# Patient Record
(demographics unavailable — no encounter records)

---

## 2024-12-04 NOTE — DISCUSSION/SUMMARY
[de-identified] : We discussed formal PT, a home exercise program, ice therapy and the role of NSAIDS for the pain. These modalities will improve the patient's functionality in their activities of daily life.  Risks, benefits and contraindications were discussed.  The patient will follow up in 6 weeks or sooner as needed.

## 2024-12-04 NOTE — PHYSICAL EXAM
[4___] : hamstring 4[unfilled]/5 [Positive] : positive Ayleen [Right] : right knee [Lateral] : lateral [[place]] : skyline [AP Standing] : anteroposterior standing [There are no fractures, subluxations or dislocations. No significant abnormalities are seen] : There are no fractures, subluxations or dislocations. No significant abnormalities are seen [NL (90)] : forward flexion 90 degrees [NL (30)] : right lateral rotation 30 degrees [NL (45)] : extension 45 degrees [NL (40)] : right lateral bending 40 degrees [5___] : right extensor hallicus longus 5[unfilled]/5 [] : non-antalgic [TWNoteComboBox7] : flexion 135 degrees [de-identified] : extension 0 degrees

## 2024-12-04 NOTE — HISTORY OF PRESENT ILLNESS
[de-identified] : Since her last visit in April 2023, patient reports pain over the patella with prolonged sitting

## 2025-01-09 NOTE — DISCUSSION/SUMMARY
[de-identified] : We discussed injection followed by continued formal PT, a home exercise program, ice therapy and the role of NSAIDS for the pain. These modalities will improve the patient's functionality in their activities of daily life.  Risks, benefits and contraindications were discussed.  The patient will follow up in 6 weeks or sooner as needed.

## 2025-01-09 NOTE — DISCUSSION/SUMMARY
[de-identified] : We discussed injection followed by continued formal PT, a home exercise program, ice therapy and the role of NSAIDS for the pain. These modalities will improve the patient's functionality in their activities of daily life.  Risks, benefits and contraindications were discussed.  The patient will follow up in 6 weeks or sooner as needed.

## 2025-01-09 NOTE — HISTORY OF PRESENT ILLNESS
[de-identified] : Patient reports increased pain with PT and HEP. She is unable to fully extend the knee. The pain is in the medial and lateral PF knee.

## 2025-01-09 NOTE — HISTORY OF PRESENT ILLNESS
[de-identified] : Patient reports increased pain with PT and HEP. She is unable to fully extend the knee. The pain is in the medial and lateral PF knee.

## 2025-01-09 NOTE — PHYSICAL EXAM
[NL (90)] : forward flexion 90 degrees [NL (30)] : right lateral rotation 30 degrees [NL (45)] : extension 45 degrees [NL (40)] : right lateral bending 40 degrees [5___] : right extensor hallicus longus 5[unfilled]/5 [4___] : hamstring 4[unfilled]/5 [Positive] : positive Ayleen [Right] : right knee [Lateral] : lateral [[place]] : skyline [AP Standing] : anteroposterior standing [There are no fractures, subluxations or dislocations. No significant abnormalities are seen] : There are no fractures, subluxations or dislocations. No significant abnormalities are seen [] : non-antalgic [TWNoteComboBox7] : flexion 135 degrees [de-identified] : extension 0 degrees

## 2025-01-09 NOTE — PROCEDURE
[Large Joint Injection] : Large joint injection [Right] : of the right [Knee] : knee [Pain] : pain [Inflammation] : inflammation [Alcohol] : alcohol [Betadine] : betadine [Ethyl Chloride sprayed topically] : ethyl chloride sprayed topically [___ cc    6mg] :  Betamethasone ([place]) ~Vcc of 6mg [___ cc    1%] : Lidocaine ~Vcc of 1%  [] : Patient tolerated procedure well [Call if redness, pain or fever occur] : call if redness, pain or fever occur [Apply ice for 15min out of every hour for the next 12-24 hours as tolerated] : apply ice for 15 minutes out of every hour for the next 12-24 hours as tolerated [Previous OTC use and PT nontherapeutic] : patient has tried OTC's including aspirin, Ibuprofen, Aleve, etc or prescription NSAIDS, and/or exercises at home and/or physical therapy without satisfactory response [Patient had decreased mobility in the joint] : patient had decreased mobility in the joint [Risks, benefits, alternatives discussed / Verbal consent obtained] : the risks benefits, and alternatives have been discussed, and verbal consent was obtained [All ultrasound images have been permanently captured and stored accordingly in our picture archiving and communication system] : All ultrasound images have been permanently captured and stored accordingly in our picture archiving and communication system [Visualization of the needle and placement of injection was performed without complication] : visualization of the needle and placement of injection was performed without complication [de-identified] : needle placement

## 2025-01-09 NOTE — PROCEDURE
[Large Joint Injection] : Large joint injection [Right] : of the right [Knee] : knee [Pain] : pain [Inflammation] : inflammation [Alcohol] : alcohol [Betadine] : betadine [Ethyl Chloride sprayed topically] : ethyl chloride sprayed topically [___ cc    6mg] :  Betamethasone ([place]) ~Vcc of 6mg [___ cc    1%] : Lidocaine ~Vcc of 1%  [] : Patient tolerated procedure well [Call if redness, pain or fever occur] : call if redness, pain or fever occur [Apply ice for 15min out of every hour for the next 12-24 hours as tolerated] : apply ice for 15 minutes out of every hour for the next 12-24 hours as tolerated [Previous OTC use and PT nontherapeutic] : patient has tried OTC's including aspirin, Ibuprofen, Aleve, etc or prescription NSAIDS, and/or exercises at home and/or physical therapy without satisfactory response [Patient had decreased mobility in the joint] : patient had decreased mobility in the joint [Risks, benefits, alternatives discussed / Verbal consent obtained] : the risks benefits, and alternatives have been discussed, and verbal consent was obtained [All ultrasound images have been permanently captured and stored accordingly in our picture archiving and communication system] : All ultrasound images have been permanently captured and stored accordingly in our picture archiving and communication system [Visualization of the needle and placement of injection was performed without complication] : visualization of the needle and placement of injection was performed without complication [de-identified] : needle placement

## 2025-01-09 NOTE — PHYSICAL EXAM
[NL (90)] : forward flexion 90 degrees [NL (30)] : right lateral rotation 30 degrees [NL (45)] : extension 45 degrees [NL (40)] : right lateral bending 40 degrees [5___] : right extensor hallicus longus 5[unfilled]/5 [4___] : hamstring 4[unfilled]/5 [Positive] : positive Ayleen [Right] : right knee [Lateral] : lateral [[place]] : skyline [AP Standing] : anteroposterior standing [There are no fractures, subluxations or dislocations. No significant abnormalities are seen] : There are no fractures, subluxations or dislocations. No significant abnormalities are seen [] : non-antalgic [TWNoteComboBox7] : flexion 135 degrees [de-identified] : extension 0 degrees

## 2025-01-27 NOTE — DISCUSSION/SUMMARY
[de-identified] : We discussed MRI to reeval formal PT, a home exercise program, ice therapy and the role of NSAIDS for the pain. These modalities will improve the patient's functionality in their activities of daily life.  Risks, benefits and contraindications were discussed.  The patient will follow up in 6 weeks or sooner as needed.

## 2025-01-27 NOTE — PHYSICAL EXAM
[NL (90)] : forward flexion 90 degrees [NL (30)] : right lateral rotation 30 degrees [NL (45)] : extension 45 degrees [NL (40)] : right lateral bending 40 degrees [5___] : right extensor hallicus longus 5[unfilled]/5 [4___] : hamstring 4[unfilled]/5 [Positive] : positive Ayleen [Right] : right knee [Lateral] : lateral [[place]] : skyline [AP Standing] : anteroposterior standing [There are no fractures, subluxations or dislocations. No significant abnormalities are seen] : There are no fractures, subluxations or dislocations. No significant abnormalities are seen [] : non-antalgic [TWNoteComboBox7] : flexion 135 degrees [de-identified] : extension 0 degrees

## 2025-01-29 NOTE — DISCUSSION/SUMMARY
[Medication Risks Reviewed] : Medication risks reviewed [de-identified] : We discussed viscosupplementation injection followed by continued formal PT, a home exercise program, ice therapy and the role of NSAIDS (meloxicam) for the pain. These modalities will improve the patient's functionality in their activities of daily life.  Risks, benefits and contraindications were discussed.  The patient will follow up in 6 weeks or sooner as needed.

## 2025-01-29 NOTE — PROCEDURE
[Large Joint Injection] : Large joint injection [Right] : of the right [Knee] : knee [Pain] : pain [Inflammation] : inflammation [X-ray evidence of Osteoarthritis on this or prior visit] : x-ray evidence of Osteoarthritis on this or prior visit [Alcohol] : alcohol [Betadine] : betadine [Ethyl Chloride sprayed topically] : ethyl chloride sprayed topically [Durolane (60mg)] : 60mg of Durolane [] : Patient tolerated procedure well [Call if redness, pain or fever occur] : call if redness, pain or fever occur [Apply ice for 15min out of every hour for the next 12-24 hours as tolerated] : apply ice for 15 minutes out of every hour for the next 12-24 hours as tolerated [Patient was advised to rest the joint(s) for ____ days] : patient was advised to rest the joint(s) for [unfilled] days [Previous OTC use and PT nontherapeutic] : patient has tried OTC's including aspirin, Ibuprofen, Aleve, etc or prescription NSAIDS, and/or exercises at home and/or physical therapy without satisfactory response [Patient had decreased mobility in the joint] : patient had decreased mobility in the joint [Risks, benefits, alternatives discussed / Verbal consent obtained] : the risks benefits, and alternatives have been discussed, and verbal consent was obtained [All ultrasound images have been permanently captured and stored accordingly in our picture archiving and communication system] : All ultrasound images have been permanently captured and stored accordingly in our picture archiving and communication system [Visualization of the needle and placement of injection was performed without complication] : visualization of the needle and placement of injection was performed without complication

## 2025-01-29 NOTE — DATA REVIEWED
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

## 2025-01-29 NOTE — PHYSICAL EXAM
[NL (90)] : forward flexion 90 degrees [NL (30)] : right lateral rotation 30 degrees [NL (45)] : extension 45 degrees [NL (40)] : right lateral bending 40 degrees [5___] : right extensor hallicus longus 5[unfilled]/5 [4___] : hamstring 4[unfilled]/5 [Positive] : positive Ayleen [Right] : right knee [Lateral] : lateral [[place]] : skyline [AP Standing] : anteroposterior standing [There are no fractures, subluxations or dislocations. No significant abnormalities are seen] : There are no fractures, subluxations or dislocations. No significant abnormalities are seen [] : non-antalgic [TWNoteComboBox7] : flexion 135 degrees [de-identified] : extension 0 degrees

## 2025-02-13 NOTE — HISTORY OF PRESENT ILLNESS
[de-identified] : Patient is 2 weeks s/p visco injection, reports improvement in ability to walk better. She does note that the lateral knee becomes painful with certain ROM and activity. She notes weakness with descending stairs. She does note pain in the lower back with rotational movement

## 2025-02-13 NOTE — DISCUSSION/SUMMARY
[Surgical risks reviewed] : Surgical risks reviewed [de-identified] : The risks of the procedure, including but not limited to infection, bleeding, anesthetic complication, neurovascular injury and the possibility of subsequent surgery were discussed; the benefits, non-operative alternatives and expectations of the proposed procedure were also discussed. Post-operative management, the procedure itself and the expected recovery period were discussed at length with the patient. The need for post-operative physical therapy and home exercise regimen, possible bracing and medication management were also discussed. Informed consent was obtained.

## 2025-02-13 NOTE — PHYSICAL EXAM
[NL (90)] : forward flexion 90 degrees [NL (30)] : right lateral rotation 30 degrees [NL (45)] : extension 45 degrees [NL (40)] : right lateral bending 40 degrees [5___] : right extensor hallicus longus 5[unfilled]/5 [4___] : hamstring 4[unfilled]/5 [Positive] : positive Ayleen [Right] : right knee [Lateral] : lateral [[place]] : skyline [AP Standing] : anteroposterior standing [There are no fractures, subluxations or dislocations. No significant abnormalities are seen] : There are no fractures, subluxations or dislocations. No significant abnormalities are seen [] : non-antalgic [TWNoteComboBox7] : flexion 135 degrees [de-identified] : extension 0 degrees

## 2025-02-13 NOTE — DATA REVIEWED
[MRI] : MRI [Right] : of the right [Knee] : knee [Report was reviewed and noted in the chart] : The report was reviewed and noted in the chart [I reviewed the films/CD and agree] : I reviewed the films/CD and agree [I reviewed the films/CD and additionally noted] : I reviewed the films/CD and additionally noted [FreeTextEntry1] : medial meniscus tear, lateral meniscus tear

## 2025-02-14 NOTE — DISCUSSION/SUMMARY
[FreeTextEntry1] : ALEXANDER WINTER is a 67 year old F who presents today Feb 14, 2025 with the above history and the following active issues:   Annual lab work was done at the [facility]. We will obtain results.  Feeling well with no exertional sx, no active CV sx. Prior testing as outlined in Cardiology summary.  BP is well controlled. She has lost 40lbs over the last 2 years. Continue HCTZ 25mg.  Annual follow up.   At present, there are no unstable coronary syndromes, decompensated heart failure, severe valvular heart disease or significant dysrhythmias. Baseline functional status is acceptable. Risk not attenuated with further CV testing. Prior testing as outlined above. The clinical benefit of the proposed procedure outweighs the associated cardiovascular risk.  Sincerely,  Fallon Fletcher, [facility]-BC Patient's history, testing, medications and any relative changes in plan of care reviewed with supervising MD: Dr. Javier Crow

## 2025-02-14 NOTE — HISTORY OF PRESENT ILLNESS
[FreeTextEntry1] : Pinky is a 67yoF with PMHx: HTN and hypothyroid.   She presents in clinic today for preoperative cardiovascular clearance pending [place] K[place] [facility] [place]18[place].   She was last seen in November 2023.  She reports feeling well with no significant illnesses or hospitalizations in the interim. She is active with walking and stair climbing, tennis with no exertional sx. Her main limitation is R knee pain.  There is no exertional chest pain, pressure or discomfort. There is no significant dyspnea on exertion or shortness of breath. There is no LE edema, PND or orthopnea. There are no symptomatic palpitations, lightheadedness, dizziness or syncope.   BP is well controlled today at 110/74.  EKG today shows SR, consistent with priors.

## 2025-02-14 NOTE — CARDIOLOGY SUMMARY
[de-identified] : 2/2023 Stress Echo Exercised 9mins on Luke protocol, 10.5 METS. EKG suggestive of ischemia, no corresponding ischemic S.echo findings despite EKG changes. [de-identified] : 2/2023 LVEF 60-65%, mild MN, mild TR, There is a membranous ventricular septal defect. Qp/Qs = 0.89. [de-identified] : 1/2024 K 3.7, Cr 0.71, eGFR 94, normal LFTs, normal thyroid function.

## 2025-02-14 NOTE — HISTORY OF PRESENT ILLNESS
[FreeTextEntry1] : Pinky is a 67yoF with PMHx: HTN and hypothyroid.   She presents in clinic today for preoperative cardiovascular clearance pending [place] K[place] [facility] [place].   She was last seen in November 2023.  She reports feeling well with no significant illnesses or hospitalizations in the interim. She is active with walking and stair climbing, tennis with no exertional sx. Her main limitation is R knee pain.  There is no exertional chest pain, pressure or discomfort. There is no significant dyspnea on exertion or shortness of breath. There is no LE edema, PND or orthopnea. There are no symptomatic palpitations, lightheadedness, dizziness or syncope.   BP is well controlled today at 110/74.  EKG today shows SR, consistent with priors.

## 2025-02-14 NOTE — PHYSICAL EXAM
[Well Developed] : well developed [No Acute Distress] : no acute distress [No Carotid Bruit] : no carotid bruit [Normal S1, S2] : normal S1, S2 [No Murmur] : no murmur [Clear Lung Fields] : clear lung fields [No Respiratory Distress] : no respiratory distress  [Normal Gait] : normal gait [No Edema] : no edema [Normal Radial B/L] : normal radial B/L [Moves all extremities] : moves all extremities [No Focal Deficits] : no focal deficits [Normal Speech] : normal speech [Alert and Oriented] : alert and oriented

## 2025-02-14 NOTE — CARDIOLOGY SUMMARY
[de-identified] : 2/2023 Stress Echo Exercised 9mins on Luke protocol, 10.5 METS. EKG suggestive of ischemia, no corresponding ischemic S.echo findings despite EKG changes. [de-identified] : 2/2023 LVEF 60-65%, mild [place], mild TR, There is a membranous ventricular septal defect. Qp/Qs = 0.89. [de-identified] : 1/2024 K 3.7, Cr 0.71, eGFR 94, normal LFTs, normal thyroid function.

## 2025-02-14 NOTE — DISCUSSION/SUMMARY
[FreeTextEntry1] : ALEXANDER WINTER is a 67 year old F who presents today Feb 14, 2025 with the above history and the following active issues:   Annual lab work was done at the [facility]. We will obtain results.  Feeling well with no exertional sx, no active CV sx. Prior testing as outlined in Cardiology summary.  BP is well controlled. She has lost 40lbs over the last 2 years. Continue HCTZ 25mg.  Annual follow up.   At present, there are no unstable coronary syndromes, decompensated heart failure, severe valvular heart disease or significant dysrhythmias. Baseline functional status is acceptable. Risk not attenuated with further CV testing. Prior testing as outlined above. The clinical benefit of the proposed procedure outweighs the associated cardiovascular risk.  Sincerely,  Fallon Fletcher, [facility]-BC Patient's history, testing, medications and any relative changes in plan of care reviewed with supervising MD: Dr. Javier Crow

## 2025-03-03 NOTE — DISCUSSION/SUMMARY
[de-identified] : We discussed formal PT, a home exercise program, ice therapy and the role of NSAIDS for the pain. These modalities will improve the patient's functionality in their activities of daily life.  Risks, benefits and contraindications were discussed.  The patient will follow up in 4 weeks or sooner as needed.

## 2025-03-03 NOTE — PHYSICAL EXAM
[NL (90)] : forward flexion 90 degrees [NL (30)] : right lateral rotation 30 degrees [NL (45)] : extension 45 degrees [NL (40)] : right lateral bending 40 degrees [5___] : right extensor hallicus longus 5[unfilled]/5 [4___] : hamstring 4[unfilled]/5 [Positive] : positive Ayleen [Right] : right knee [Lateral] : lateral [[place]] : skyline [AP Standing] : anteroposterior standing [There are no fractures, subluxations or dislocations. No significant abnormalities are seen] : There are no fractures, subluxations or dislocations. No significant abnormalities are seen [] : non-antalgic [TWNoteComboBox7] : flexion 135 degrees [de-identified] : extension 0 degrees

## 2025-03-03 NOTE — DISCUSSION/SUMMARY
[de-identified] : We discussed formal PT, a home exercise program, ice therapy and the role of NSAIDS for the pain. These modalities will improve the patient's functionality in their activities of daily life.  Risks, benefits and contraindications were discussed.  The patient will follow up in 4 weeks or sooner as needed.

## 2025-03-03 NOTE — PHYSICAL EXAM
[NL (90)] : forward flexion 90 degrees [NL (30)] : right lateral rotation 30 degrees [NL (45)] : extension 45 degrees [NL (40)] : right lateral bending 40 degrees [5___] : right extensor hallicus longus 5[unfilled]/5 [4___] : hamstring 4[unfilled]/5 [Positive] : positive Ayleen [Right] : right knee [Lateral] : lateral [[place]] : skyline [AP Standing] : anteroposterior standing [There are no fractures, subluxations or dislocations. No significant abnormalities are seen] : There are no fractures, subluxations or dislocations. No significant abnormalities are seen [] : non-antalgic [TWNoteComboBox7] : flexion 135 degrees [de-identified] : extension 0 degrees

## 2025-03-10 NOTE — DISCUSSION/SUMMARY
[de-identified] : We discussed MRI to eval for stress fx. We did discuss due to the fact that the pain comes on randomly and is not always able to be reproduced that it could be nerve related.

## 2025-03-10 NOTE — PHYSICAL EXAM
[NL (40)] : plantar flexion 40 degrees [NL 30)] : inversion 30 degrees [NL (20)] : eversion 20 degrees [5___] : [facility] 5[unfilled]/5 [2+] : posterior tibialis pulse: 2+ [Normal] : saphenous nerve sensation normal [] : patient ambulates without assistive device [Right] : right ankle [There are no fractures, subluxations or dislocations. No significant abnormalities are seen] : There are no fractures, subluxations or dislocations. No significant abnormalities are seen

## 2025-03-19 NOTE — DATA REVIEWED
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

## 2025-03-19 NOTE — DISCUSSION/SUMMARY
[de-identified] : We discussed workup for the lumbar spine as patient's pain does not correlate with ankle MRI. We discussed that this pain can be radicular in nature and may be nerve related. We discussed home exercise program, ice therapy and the role of NSAIDS for the pain. These modalities will improve the patient's functionality in their activities of daily life.  Risks, benefits and contraindications were discussed.  The patient will follow up after MRI or sooner as needed.

## 2025-03-19 NOTE — PHYSICAL EXAM
[NL (20)] : eversion 20 degrees [2+] : posterior tibialis pulse: 2+ [Normal] : saphenous nerve sensation normal [Right] : right ankle [There are no fractures, subluxations or dislocations. No significant abnormalities are seen] : There are no fractures, subluxations or dislocations. No significant abnormalities are seen [NL (90)] : forward flexion 90 degrees [NL (30)] : right lateral rotation 30 degrees [NL (45)] : extension 45 degrees [NL (40)] : right lateral bending 40 degrees [5___] : right extensor hallicus longus 5[unfilled]/5 [] : non-antalgic

## 2025-03-19 NOTE — DISCUSSION/SUMMARY
[de-identified] : We discussed workup for the lumbar spine as patient's pain does not correlate with ankle MRI. We discussed that this pain can be radicular in nature and may be nerve related. We discussed home exercise program, ice therapy and the role of NSAIDS for the pain. These modalities will improve the patient's functionality in their activities of daily life.  Risks, benefits and contraindications were discussed.  The patient will follow up after MRI or sooner as needed.

## 2025-04-09 NOTE — PHYSICAL EXAM
[NL (90)] : forward flexion 90 degrees [NL (45)] : extension 45 degrees [4___] : hamstring 4[unfilled]/5 [Positive] : positive Ayleen [Lateral] : lateral [[place]] : skyline [AP Standing] : anteroposterior standing [NL (40)] : plantar flexion 40 degrees [NL 30)] : inversion 30 degrees [NL (20)] : eversion 20 degrees [5___] : [facility] 5[unfilled]/5 [2+] : posterior tibialis pulse: 2+ [Normal] : saphenous nerve sensation normal [Right] : right ankle [There are no fractures, subluxations or dislocations. No significant abnormalities are seen] : There are no fractures, subluxations or dislocations. No significant abnormalities are seen [TWNoteComboBox7] : flexion 135 degrees [de-identified] : extension 0 degrees [] : non-antalgic

## 2025-04-09 NOTE — DISCUSSION/SUMMARY
[de-identified] : We discussed formal PT, a home exercise program, ice therapy and the role of NSAIDS for the pain. These modalities will improve the patient's functionality in their activities of daily life.  Risks, benefits and contraindications were discussed.  The patient will follow up in 6 weeks or sooner as needed.

## 2025-07-10 NOTE — PHYSICAL EXAM
[NL (90)] : forward flexion 90 degrees [NL (45)] : extension 45 degrees [4___] : hamstring 4[unfilled]/5 [Positive] : positive Ayleen [Lateral] : lateral [[place]] : skyline [AP Standing] : anteroposterior standing [NL (40)] : plantar flexion 40 degrees [NL 30)] : inversion 30 degrees [NL (20)] : eversion 20 degrees [5___] : [facility] 5[unfilled]/5 [2+] : posterior tibialis pulse: 2+ [Normal] : saphenous nerve sensation normal [Right] : right ankle [There are no fractures, subluxations or dislocations. No significant abnormalities are seen] : There are no fractures, subluxations or dislocations. No significant abnormalities are seen [TWNoteComboBox7] : flexion 135 degrees [de-identified] : extension 0 degrees [] : non-antalgic

## 2025-07-10 NOTE — PROCEDURE
[Other: ____] : [unfilled] [Sterile technique used] : sterile technique used [Effusion] : effusion [Large Joint Injection] : Large joint injection [Right] : of the right [Knee] : knee [Pain] : pain [Inflammation] : inflammation [X-ray evidence of Osteoarthritis on this or prior visit] : x-ray evidence of Osteoarthritis on this or prior visit [Alcohol] : alcohol [Betadine] : betadine [Ethyl Chloride sprayed topically] : ethyl chloride sprayed topically [___ cc    6mg] :  Betamethasone ([place]) ~Vcc of 6mg [___ cc    1%] : Lidocaine ~Vcc of 1%  [] : Patient tolerated procedure well [Call if redness, pain or fever occur] : call if redness, pain or fever occur [Apply ice for 15min out of every hour for the next 12-24 hours as tolerated] : apply ice for 15 minutes out of every hour for the next 12-24 hours as tolerated [Previous OTC use and PT nontherapeutic] : patient has tried OTC's including aspirin, Ibuprofen, Aleve, etc or prescription NSAIDS, and/or exercises at home and/or physical therapy without satisfactory response [Patient had decreased mobility in the joint] : patient had decreased mobility in the joint [Risks, benefits, alternatives discussed / Verbal consent obtained] : the risks benefits, and alternatives have been discussed, and verbal consent was obtained [Altered anatomic landmarks d/t erosive arthritis] : altered anatomic landmarks d/t erosive arthritis [All ultrasound images have been permanently captured and stored accordingly in our picture archiving and communication system] : All ultrasound images have been permanently captured and stored accordingly in our picture archiving and communication system [Visualization of the needle and placement of injection was performed without complication] : visualization of the needle and placement of injection was performed without complication [de-identified] : 12ccs  [de-identified] : Straw colored fluid [de-identified] : Aspiration

## 2025-07-10 NOTE — HISTORY OF PRESENT ILLNESS
[de-identified] : Since her last visit in April, patient reports that she has been treating with ARIANNA, most recently on May 29. She notes no real improvement with the injections but is scheduled for another in August. She is c/o medial PF pain with catching and pain with all walking, as well as some swelling.

## 2025-07-10 NOTE — DISCUSSION/SUMMARY
[Medication Risks Reviewed] : Medication risks reviewed [de-identified] : Patient to see Dr Schmitt for further eval of the spine.  She will plan to go ahead with viscosupplimentation after 7/29.  We discussed formal PT, a home exercise program, ice therapy and the role of NSAIDS for the pain. These modalities will improve the patient's functionality in their activities of daily life.  Risks, benefits and contraindications were discussed.  The patient will follow up in 6 weeks or sooner as needed.